# Patient Record
Sex: FEMALE | Race: WHITE | Employment: OTHER | URBAN - METROPOLITAN AREA
[De-identification: names, ages, dates, MRNs, and addresses within clinical notes are randomized per-mention and may not be internally consistent; named-entity substitution may affect disease eponyms.]

---

## 2019-10-03 ENCOUNTER — HOSPITAL ENCOUNTER (OUTPATIENT)
Dept: CT IMAGING | Age: 69
Discharge: HOME | End: 2019-10-03
Payer: MEDICARE

## 2019-10-03 DIAGNOSIS — C34.01 MALIGNANT NEOPLASM OF RIGHT MAIN BRONCHUS (HCC): ICD-10-CM

## 2019-10-03 PROCEDURE — 71260 CT THORAX DX C+: CPT

## 2019-10-03 PROCEDURE — 6360000004 HC RX CONTRAST MEDICATION: Performed by: INTERNAL MEDICINE

## 2019-10-03 RX ADMIN — IOPAMIDOL 75 ML: 755 INJECTION, SOLUTION INTRAVENOUS at 17:42

## 2024-07-25 ENCOUNTER — HOSPITAL ENCOUNTER (EMERGENCY)
Age: 74
Discharge: HOME | End: 2024-07-25
Attending: STUDENT IN AN ORGANIZED HEALTH CARE EDUCATION/TRAINING PROGRAM
Payer: MEDICARE

## 2024-07-25 ENCOUNTER — APPOINTMENT (OUTPATIENT)
Dept: GENERAL RADIOLOGY | Age: 74
End: 2024-07-25
Payer: MEDICARE

## 2024-07-25 ENCOUNTER — APPOINTMENT (OUTPATIENT)
Dept: CT IMAGING | Age: 74
End: 2024-07-25
Payer: MEDICARE

## 2024-07-25 VITALS
DIASTOLIC BLOOD PRESSURE: 87 MMHG | WEIGHT: 84.7 LBS | SYSTOLIC BLOOD PRESSURE: 110 MMHG | HEART RATE: 79 BPM | HEIGHT: 63 IN | OXYGEN SATURATION: 94 % | BODY MASS INDEX: 15.01 KG/M2 | RESPIRATION RATE: 20 BRPM | TEMPERATURE: 97.7 F

## 2024-07-25 DIAGNOSIS — S32.599A CLOSED FRACTURE OF PUBIC RAMUS, UNSPECIFIED LATERALITY, INITIAL ENCOUNTER (HCC): ICD-10-CM
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
## 2024-07-25 DIAGNOSIS — S20.219A CONTUSION OF RIB, UNSPECIFIED LATERALITY, INITIAL ENCOUNTER: ICD-10-CM

## 2024-07-25 PROCEDURE — 99284 EMERGENCY DEPT VISIT MOD MDM: CPT

## 2024-07-25 PROCEDURE — 6370000000 HC RX 637 (ALT 250 FOR IP): Performed by: EMERGENCY MEDICINE

## 2024-07-25 PROCEDURE — 71250 CT THORAX DX C-: CPT

## 2024-07-25 PROCEDURE — 71101 X-RAY EXAM UNILAT RIBS/CHEST: CPT

## 2024-07-25 PROCEDURE — 73502 X-RAY EXAM HIP UNI 2-3 VIEWS: CPT

## 2024-07-25 RX ORDER — ALBUTEROL SULFATE 2.5 MG/3ML
2.5 SOLUTION RESPIRATORY (INHALATION) EVERY 6 HOURS PRN
COMMUNITY
Start: 2024-05-02 | End: 2025-04-27

## 2024-07-25 RX ORDER — LOPERAMIDE HYDROCHLORIDE 2 MG/1
2 CAPSULE ORAL 4 TIMES DAILY PRN
COMMUNITY
Start: 2024-06-17

## 2024-07-25 RX ORDER — CETIRIZINE HYDROCHLORIDE 10 MG/1
TABLET, CHEWABLE ORAL
COMMUNITY
Start: 2010-09-13

## 2024-07-25 RX ORDER — LISINOPRIL 10 MG/1
TABLET ORAL
COMMUNITY
Start: 2010-09-13

## 2024-07-25 RX ORDER — NALOXONE HYDROCHLORIDE 4 MG/.1ML
SPRAY NASAL
COMMUNITY
Start: 2023-03-17

## 2024-07-25 RX ORDER — OXYCODONE HYDROCHLORIDE 5 MG/1
10 TABLET ORAL ONCE
Status: COMPLETED | OUTPATIENT
Start: 2024-07-25 | End: 2024-07-25

## 2024-07-25 RX ORDER — HYDROCHLOROTHIAZIDE 25 MG/1
25 TABLET ORAL DAILY
COMMUNITY
Start: 2023-12-12

## 2024-07-25 RX ORDER — ALENDRONATE SODIUM 70 MG/1
70 TABLET ORAL
COMMUNITY
Start: 2024-06-17

## 2024-07-25 RX ORDER — OXYCODONE HYDROCHLORIDE AND ACETAMINOPHEN 5; 325 MG/1; MG/1
1 TABLET ORAL EVERY 4 HOURS PRN
COMMUNITY
Start: 2010-09-13 | End: 2024-09-14

## 2024-07-25 RX ORDER — ATORVASTATIN CALCIUM 10 MG/1
TABLET, FILM COATED ORAL
COMMUNITY
Start: 2023-06-19

## 2024-07-25 RX ORDER — LORAZEPAM 0.5 MG/1
.5-1 TABLET ORAL
COMMUNITY
Start: 2024-07-02 | End: 2024-08-01

## 2024-07-25 RX ORDER — ALBUTEROL SULFATE 90 UG/1
2 AEROSOL, METERED RESPIRATORY (INHALATION) EVERY 4 HOURS PRN
COMMUNITY
Start: 2023-07-06

## 2024-07-25 RX ORDER — MELATONIN
1 DAILY
COMMUNITY
Start: 2024-06-22

## 2024-07-25 RX ORDER — MORPHINE SULFATE 20 MG/ML
5-10 SOLUTION ORAL
COMMUNITY
Start: 2024-07-02 | End: 2024-08-01

## 2024-07-25 RX ORDER — FOLIC ACID 1 MG/1
1 TABLET ORAL DAILY
COMMUNITY
Start: 2024-06-17

## 2024-07-25 RX ORDER — TIOTROPIUM BROMIDE 18 UG/1
18 CAPSULE ORAL; RESPIRATORY (INHALATION)
COMMUNITY

## 2024-07-25 RX ORDER — DONEPEZIL HYDROCHLORIDE 5 MG/1
5 TABLET, FILM COATED ORAL NIGHTLY
COMMUNITY
Start: 2023-07-19

## 2024-07-25 RX ORDER — AMITRIPTYLINE HYDROCHLORIDE 50 MG/1
50 TABLET, FILM COATED ORAL
COMMUNITY

## 2024-07-25 RX ORDER — LIDOCAINE 4 G/G
1 PATCH TOPICAL ONCE
Status: DISCONTINUED | OUTPATIENT
Start: 2024-07-25 | End: 2024-07-26 | Stop reason: HOSPADM

## 2024-07-25 RX ORDER — NICOTINE 21 MG/24HR
PATCH, TRANSDERMAL 24 HOURS TRANSDERMAL
COMMUNITY
Start: 2010-09-13

## 2024-07-25 RX ORDER — INFLIXIMAB 100 MG/10ML
INJECTION, POWDER, LYOPHILIZED, FOR SOLUTION INTRAVENOUS
COMMUNITY
Start: 2010-09-13

## 2024-07-25 RX ORDER — MEGESTROL ACETATE 40 MG/ML
SUSPENSION ORAL
COMMUNITY
Start: 2024-05-13

## 2024-07-25 RX ORDER — LIDOCAINE 50 MG/G
1 PATCH TOPICAL EVERY 24 HOURS
COMMUNITY
Start: 2024-06-17

## 2024-07-25 RX ORDER — AMOXICILLIN 250 MG
1-2 CAPSULE ORAL DAILY PRN
COMMUNITY
Start: 2024-07-09

## 2024-07-25 RX ORDER — FLUTICASONE PROPIONATE 50 MCG
1 SPRAY, SUSPENSION (ML) NASAL DAILY
COMMUNITY
Start: 2023-03-17

## 2024-07-25 RX ORDER — MAGNESIUM 200 MG
TABLET ORAL
COMMUNITY

## 2024-07-25 RX ORDER — MORPHINE SULFATE 15 MG/1
15 TABLET, FILM COATED, EXTENDED RELEASE ORAL 2 TIMES DAILY
COMMUNITY
Start: 2024-07-23 | End: 2024-09-21

## 2024-07-25 RX ORDER — AMLODIPINE BESYLATE 5 MG/1
TABLET ORAL
COMMUNITY

## 2024-07-25 RX ORDER — LANOLIN ALCOHOL/MO/W.PET/CERES
3 CREAM (GRAM) TOPICAL NIGHTLY
COMMUNITY
Start: 2024-07-17

## 2024-07-25 RX ORDER — OXYBUTYNIN CHLORIDE 5 MG/1
TABLET ORAL
COMMUNITY
Start: 2024-04-22

## 2024-07-25 RX ORDER — MONTELUKAST SODIUM 10 MG/1
TABLET ORAL
COMMUNITY
Start: 2010-09-13

## 2024-07-25 RX ORDER — TIMOLOL MALEATE 5 MG/ML
SOLUTION/ DROPS OPHTHALMIC
COMMUNITY
Start: 2024-04-21

## 2024-07-25 RX ORDER — HALOPERIDOL 2 MG/1
2-4 TABLET ORAL
COMMUNITY
Start: 2024-07-02

## 2024-07-25 RX ORDER — CITALOPRAM HYDROBROMIDE 10 MG/1
TABLET ORAL
COMMUNITY
Start: 2010-09-13

## 2024-07-25 RX ORDER — GABAPENTIN 600 MG/1
TABLET ORAL
COMMUNITY
Start: 2010-09-13

## 2024-07-25 RX ORDER — ONDANSETRON 4 MG/1
TABLET, FILM COATED ORAL
COMMUNITY
Start: 2024-06-25

## 2024-07-25 RX ORDER — MIRTAZAPINE 7.5 MG/1
7.5 TABLET, FILM COATED ORAL NIGHTLY
COMMUNITY
Start: 2022-07-28

## 2024-07-25 RX ORDER — DRONABINOL 2.5 MG/1
2.5 CAPSULE ORAL
COMMUNITY
Start: 2024-02-01

## 2024-07-25 RX ORDER — METHOCARBAMOL 500 MG/1
500 TABLET, FILM COATED ORAL 2 TIMES DAILY PRN
COMMUNITY
Start: 2024-02-20

## 2024-07-25 RX ORDER — LORATADINE 10 MG/1
10 TABLET ORAL DAILY PRN
COMMUNITY

## 2024-07-25 RX ADMIN — OXYCODONE 10 MG: 5 TABLET ORAL at 22:41

## 2024-07-26 NOTE — DISCHARGE INSTRUCTIONS
Your CAT scan showed no signs of a rib fracture and so you likely have a bruise of the ribs.  You do have nonoperative pelvic fractures. You may be weight bearing as tolerated. Have Hospice manage you urinary catheter and remove it as soon as you are mobile enough to consistently use the bathroom or return to straight catheter use. Continue with your home pain medications.

## 2024-07-26 NOTE — ED PROVIDER NOTES
THE [facility]  EMERGENCY DEPARTMENT ENCOUNTER          ATTENDING PHYSICIAN NOTE       Date of evaluation: 7/25/2024    ADDENDUM:      Care of this patient was assumed from Dr. Sosa.  The patient was seen for Rib Injury  .  The patient's initial evaluation and plan have been discussed with the prior provider who initially evaluated the patient.  Nursing Notes, Past Medical Hx, Past Surgical Hx, Social Hx, Allergies, and Family Hx were all reviewed.    ASSESSMENT / PLAN  (MEDICAL DECISION MAKING)     Carolann Richards is a 73 y.o. female who presents with fall. Rib contusion without rib fracture on CT scan. Pubic ramus fractures noted. Patient to be WBAT. [place] for comfort until she is more mobile. Family comfortable with this. Is in Hospice. Patient wants to go home and I think this is appropriate with return precautions.     Is this patient to be included in the SEP-1 core measure? No Exclusion criteria - the patient is NOT to be included for SEP-1 Core Measure due to: Infection is not suspected    Medical Decision Making  Amount and/or Complexity of Data Reviewed  Radiology: ordered.    Risk  OTC drugs.  Prescription drug management.        Clinical Impression     1. Fall, initial encounter    2. Closed fracture of pubic ramus, unspecified laterality, initial encounter ([facility])        Disposition     PATIENT REFERRED TO:  No follow-up provider specified.    DISCHARGE MEDICATIONS:  New Prescriptions    No medications on file       DISPOSITION          Diagnostic Results and Other Data     RADIOLOGY:  CT CHEST ABDOMEN PELVIS WO CONTRAST Additional Contrast? None   Final Result      CHEST:   1.  No acute traumatic abnormality.   2.  Right suprahilar mass concerning for primary lung malignancy with occlusion of the right upper lobe airways and complete collapse of the right upper lobe.   3.  Irregular right lower lobe pulmonary nodule suspicious for metastasis.      ABDOMEN AND PELVIS:   1.  Mildly displaced left 
TABLET    Take 1 tablet by mouth nightly    METHOCARBAMOL (ROBAXIN) 500 MG TABLET    Take 1 tablet by mouth 2 times daily as needed    MIRTAZAPINE (REMERON) 7.5 MG TABLET    Take 1 tablet by mouth nightly    MONTELUKAST (SINGULAIR) 10 MG TABLET    SINGULAIR 10 MG TABS    MORPHINE (MS CONTIN) 15 MG EXTENDED RELEASE TABLET    Take 1 tablet by mouth 2 times daily. Max Daily Amount: 30 mg    MORPHINE SULFATE (MORPHINE 20MG/ML) SOLN CONCENTRATED SOLUTION    Take 0.25-0.5 mLs by mouth every 2 hours as needed. Max Daily Amount: 120 mg    NALOXONE (NARCAN) 4 MG/0.1ML LIQD NASAL SPRAY    Call 911. May repeat dose in other nostril if no response in 3 minutes.    NICOTINE (EQ NICOTINE) 21 MG/24HR    EQ NICOTINE 21 MG/24HR PT24    ONDANSETRON (ZOFRAN) 4 MG TABLET    Ondansetron Oral        active    OXYBUTYNIN (DITROPAN) 5 MG TABLET    TAKE 0.5 TABLETS (2.5 MG TOTAL) BY MOUTH 3 TIMES A DAY.    OXYCODONE-ACETAMINOPHEN (PERCOCET) 5-325 MG PER TABLET    Take 1 tablet by mouth every 4 hours as needed. Max Daily Amount: 6 tablets    SENNA-DOCUSATE (PERICOLACE) 8.6-50 MG PER TABLET    Take 1-2 tablets by mouth daily as needed    TIMOLOL (TIMOPTIC) 0.5 % OPHTHALMIC SOLUTION    INSTILL 1 BY OPHTHALMIC ROUTE EVERY 12 HOURS INTO BOTH EYES    TIOTROPIUM (SPIRIVA) 18 MCG INHALATION CAPSULE    Inhale 1 capsule into the lungs    VITAMIN D3 (CHOLECALCIFEROL) 25 MCG (1000 UT) TABS TABLET    Take 1 tablet by mouth daily       Allergies     She is allergic to cat hair extract and nitrofurantoin.    Physical Exam     INITIAL VITALS: BP: 115/68, Temp: 97.7 °F (36.5 °C), Pulse: 80, Respirations: 20, SpO2: 99 %   Physical Exam  Constitutional:       Comments: Chronically ill and cachectic.  Awake and responds to questions.  Mildly confused   HENT:      Head: Normocephalic and atraumatic.      Mouth/Throat:      Mouth: Mucous membranes are moist.      Pharynx: Oropharynx is clear.   Eyes:      Comments: blind   Cardiovascular:      Rate and Rhythm:

## 2024-07-26 NOTE — ED NOTES
Report called to Magda FINLEY at [facility]. ( [phone]) [place] transport ETA [id number].      Millie Kessler, TUSHAR  07/25/24 2018